# Patient Record
Sex: MALE | Race: WHITE | NOT HISPANIC OR LATINO | Employment: FULL TIME | URBAN - NONMETROPOLITAN AREA
[De-identification: names, ages, dates, MRNs, and addresses within clinical notes are randomized per-mention and may not be internally consistent; named-entity substitution may affect disease eponyms.]

---

## 2023-06-09 ENCOUNTER — APPOINTMENT (OUTPATIENT)
Dept: PRIMARY CARE | Facility: CLINIC | Age: 58
End: 2023-06-09
Payer: COMMERCIAL

## 2023-11-15 ENCOUNTER — HOSPITAL ENCOUNTER (EMERGENCY)
Facility: HOSPITAL | Age: 58
Discharge: HOME | End: 2023-11-15
Attending: EMERGENCY MEDICINE
Payer: COMMERCIAL

## 2023-11-15 ENCOUNTER — APPOINTMENT (OUTPATIENT)
Dept: RADIOLOGY | Facility: HOSPITAL | Age: 58
End: 2023-11-15
Payer: COMMERCIAL

## 2023-11-15 VITALS
HEIGHT: 70 IN | TEMPERATURE: 98.2 F | WEIGHT: 235 LBS | HEART RATE: 99 BPM | RESPIRATION RATE: 20 BRPM | OXYGEN SATURATION: 98 % | BODY MASS INDEX: 33.64 KG/M2 | DIASTOLIC BLOOD PRESSURE: 87 MMHG | SYSTOLIC BLOOD PRESSURE: 141 MMHG

## 2023-11-15 DIAGNOSIS — R07.9 CHEST PAIN, UNSPECIFIED TYPE: Primary | ICD-10-CM

## 2023-11-15 LAB
ALBUMIN SERPL BCP-MCNC: 4.5 G/DL (ref 3.4–5)
ALP SERPL-CCNC: 95 U/L (ref 33–120)
ALT SERPL W P-5'-P-CCNC: 45 U/L (ref 10–52)
ANION GAP SERPL CALC-SCNC: 18 MMOL/L (ref 10–20)
APPEARANCE UR: CLEAR
AST SERPL W P-5'-P-CCNC: 40 U/L (ref 9–39)
BASOPHILS # BLD AUTO: 0.05 X10*3/UL (ref 0–0.1)
BASOPHILS NFR BLD AUTO: 0.5 %
BILIRUB DIRECT SERPL-MCNC: 0.1 MG/DL (ref 0–0.3)
BILIRUB SERPL-MCNC: 0.4 MG/DL (ref 0–1.2)
BILIRUB UR STRIP.AUTO-MCNC: NEGATIVE MG/DL
BUN SERPL-MCNC: 37 MG/DL (ref 6–23)
CALCIUM SERPL-MCNC: 9.5 MG/DL (ref 8.6–10.3)
CARDIAC TROPONIN I PNL SERPL HS: 4 NG/L (ref 0–20)
CARDIAC TROPONIN I PNL SERPL HS: 5 NG/L (ref 0–20)
CHLORIDE SERPL-SCNC: 110 MMOL/L (ref 98–107)
CO2 SERPL-SCNC: 15 MMOL/L (ref 21–32)
COLOR UR: YELLOW
CREAT SERPL-MCNC: 2.7 MG/DL (ref 0.5–1.3)
EOSINOPHIL # BLD AUTO: 0.27 X10*3/UL (ref 0–0.7)
EOSINOPHIL NFR BLD AUTO: 2.5 %
ERYTHROCYTE [DISTWIDTH] IN BLOOD BY AUTOMATED COUNT: 14.5 % (ref 11.5–14.5)
GFR SERPL CREATININE-BSD FRML MDRD: 27 ML/MIN/1.73M*2
GLUCOSE SERPL-MCNC: 173 MG/DL (ref 74–99)
GLUCOSE UR STRIP.AUTO-MCNC: NEGATIVE MG/DL
HCT VFR BLD AUTO: 39.6 % (ref 41–52)
HGB BLD-MCNC: 13.8 G/DL (ref 13.5–17.5)
HYALINE CASTS #/AREA URNS AUTO: ABNORMAL /LPF
IMM GRANULOCYTES # BLD AUTO: 0.03 X10*3/UL (ref 0–0.7)
IMM GRANULOCYTES NFR BLD AUTO: 0.3 % (ref 0–0.9)
INR PPP: 1.1 (ref 0.9–1.1)
KETONES UR STRIP.AUTO-MCNC: NEGATIVE MG/DL
LACTATE SERPL-SCNC: 1.2 MMOL/L (ref 0.4–2)
LEUKOCYTE ESTERASE UR QL STRIP.AUTO: NEGATIVE
LIPASE SERPL-CCNC: 50 U/L (ref 9–82)
LYMPHOCYTES # BLD AUTO: 1.51 X10*3/UL (ref 1.2–4.8)
LYMPHOCYTES NFR BLD AUTO: 14 %
MAGNESIUM SERPL-MCNC: 1.31 MG/DL (ref 1.6–2.4)
MCH RBC QN AUTO: 34 PG (ref 26–34)
MCHC RBC AUTO-ENTMCNC: 34.8 G/DL (ref 32–36)
MCV RBC AUTO: 98 FL (ref 80–100)
MONOCYTES # BLD AUTO: 0.68 X10*3/UL (ref 0.1–1)
MONOCYTES NFR BLD AUTO: 6.3 %
NEUTROPHILS # BLD AUTO: 8.23 X10*3/UL (ref 1.2–7.7)
NEUTROPHILS NFR BLD AUTO: 76.4 %
NITRITE UR QL STRIP.AUTO: NEGATIVE
NRBC BLD-RTO: 0 /100 WBCS (ref 0–0)
PH UR STRIP.AUTO: 5 [PH]
PLATELET # BLD AUTO: 194 X10*3/UL (ref 150–450)
POTASSIUM SERPL-SCNC: 5 MMOL/L (ref 3.5–5.3)
PROT SERPL-MCNC: 7.1 G/DL (ref 6.4–8.2)
PROT UR STRIP.AUTO-MCNC: ABNORMAL MG/DL
PROTHROMBIN TIME: 12.3 SECONDS (ref 9.8–12.8)
RBC # BLD AUTO: 4.06 X10*6/UL (ref 4.5–5.9)
RBC # UR STRIP.AUTO: NEGATIVE /UL
RBC #/AREA URNS AUTO: ABNORMAL /HPF
SODIUM SERPL-SCNC: 138 MMOL/L (ref 136–145)
SP GR UR STRIP.AUTO: 1.01
UROBILINOGEN UR STRIP.AUTO-MCNC: <2 MG/DL
WBC # BLD AUTO: 10.8 X10*3/UL (ref 4.4–11.3)
WBC #/AREA URNS AUTO: ABNORMAL /HPF

## 2023-11-15 PROCEDURE — 81001 URINALYSIS AUTO W/SCOPE: CPT | Performed by: HEALTH CARE PROVIDER

## 2023-11-15 PROCEDURE — 36415 COLL VENOUS BLD VENIPUNCTURE: CPT | Performed by: HEALTH CARE PROVIDER

## 2023-11-15 PROCEDURE — 84484 ASSAY OF TROPONIN QUANT: CPT | Performed by: HEALTH CARE PROVIDER

## 2023-11-15 PROCEDURE — 80053 COMPREHEN METABOLIC PANEL: CPT | Performed by: HEALTH CARE PROVIDER

## 2023-11-15 PROCEDURE — 2500000004 HC RX 250 GENERAL PHARMACY W/ HCPCS (ALT 636 FOR OP/ED): Performed by: HEALTH CARE PROVIDER

## 2023-11-15 PROCEDURE — 83605 ASSAY OF LACTIC ACID: CPT | Performed by: HEALTH CARE PROVIDER

## 2023-11-15 PROCEDURE — 85025 COMPLETE CBC W/AUTO DIFF WBC: CPT | Performed by: HEALTH CARE PROVIDER

## 2023-11-15 PROCEDURE — 85610 PROTHROMBIN TIME: CPT | Performed by: HEALTH CARE PROVIDER

## 2023-11-15 PROCEDURE — 2500000001 HC RX 250 WO HCPCS SELF ADMINISTERED DRUGS (ALT 637 FOR MEDICARE OP): Performed by: HEALTH CARE PROVIDER

## 2023-11-15 PROCEDURE — 96365 THER/PROPH/DIAG IV INF INIT: CPT

## 2023-11-15 PROCEDURE — 76705 ECHO EXAM OF ABDOMEN: CPT

## 2023-11-15 PROCEDURE — 71045 X-RAY EXAM CHEST 1 VIEW: CPT

## 2023-11-15 PROCEDURE — 99285 EMERGENCY DEPT VISIT HI MDM: CPT | Mod: 25 | Performed by: EMERGENCY MEDICINE

## 2023-11-15 PROCEDURE — 76705 ECHO EXAM OF ABDOMEN: CPT | Performed by: RADIOLOGY

## 2023-11-15 PROCEDURE — 83690 ASSAY OF LIPASE: CPT | Performed by: HEALTH CARE PROVIDER

## 2023-11-15 PROCEDURE — 82248 BILIRUBIN DIRECT: CPT | Performed by: HEALTH CARE PROVIDER

## 2023-11-15 PROCEDURE — 83735 ASSAY OF MAGNESIUM: CPT | Performed by: HEALTH CARE PROVIDER

## 2023-11-15 PROCEDURE — 71045 X-RAY EXAM CHEST 1 VIEW: CPT | Performed by: RADIOLOGY

## 2023-11-15 RX ORDER — NAPROXEN SODIUM 220 MG/1
324 TABLET, FILM COATED ORAL ONCE
Status: COMPLETED | OUTPATIENT
Start: 2023-11-15 | End: 2023-11-15

## 2023-11-15 RX ADMIN — MAGNESIUM SULFATE HEPTAHYDRATE 1 G: 500 INJECTION, SOLUTION INTRAMUSCULAR; INTRAVENOUS at 16:20

## 2023-11-15 RX ADMIN — ASPIRIN 81 MG CHEWABLE TABLET 324 MG: 81 TABLET CHEWABLE at 15:54

## 2023-11-15 ASSESSMENT — HEART SCORE
RISK FACTORS: 1-2 RISK FACTORS
HISTORY: SLIGHTLY SUSPICIOUS
HEART SCORE: 2
TROPONIN: LESS THAN OR EQUAL TO NORMAL LIMIT
AGE: 45-64
ECG: NORMAL

## 2023-11-15 ASSESSMENT — PAIN SCALES - GENERAL
PAINLEVEL_OUTOF10: 8
PAINLEVEL_OUTOF10: 8

## 2023-11-15 ASSESSMENT — PAIN DESCRIPTION - LOCATION: LOCATION: CHEST

## 2023-11-15 ASSESSMENT — PAIN - FUNCTIONAL ASSESSMENT
PAIN_FUNCTIONAL_ASSESSMENT: 0-10
PAIN_FUNCTIONAL_ASSESSMENT: 0-10

## 2023-11-15 ASSESSMENT — COLUMBIA-SUICIDE SEVERITY RATING SCALE - C-SSRS
2. HAVE YOU ACTUALLY HAD ANY THOUGHTS OF KILLING YOURSELF?: NO
1. IN THE PAST MONTH, HAVE YOU WISHED YOU WERE DEAD OR WISHED YOU COULD GO TO SLEEP AND NOT WAKE UP?: NO
6. HAVE YOU EVER DONE ANYTHING, STARTED TO DO ANYTHING, OR PREPARED TO DO ANYTHING TO END YOUR LIFE?: NO

## 2023-11-15 ASSESSMENT — PAIN DESCRIPTION - ORIENTATION: ORIENTATION: RIGHT

## 2023-11-15 NOTE — ED PROVIDER NOTES
HPI   Chief Complaint   Patient presents with    Chest Pain     Chest pain on R side started yesterday, along with some SOB. Pt states hx HTN       CC: chest pain  HPI:   58-year-old male presents ED with cute onset of right lower chest wall pain right upper abdominal tenderness worse with eating or deep inspirations patient reports history of solitary kidney congenital, chronic kidney disease, hypertension, hyperlipidemia, denies any associated nausea vomiting, he denies any dizziness or lightheadedness.  Patient denies any abdominal surgeries or history of gallstones.  Patient is seen by cardiologist he reported last month having normal echo and previous normal stress test.    Additional Limitations to History:   External Records Reviewed: I reviewed recent and relevant outside records including   History Obtained From:     Past Medical History: Per HPI  Medications: Reviewed in EMR and with patient  Allergies:  Reviewed in EMR  Past Surgical History:   Social History:     ------------------------------------------------------------------------------------------------------  Physical Exam:  --Vital signs reviewed in nursing triage note, EMR flow sheets, and at patient's bedside  GEN:  A&Ox3, no acute distress, appears comfortable.  Conversational and appropriate.  No confusion or gross mental status changes.  EYES: EOMI, non-injected sclera.  ENT: Moist mucous membranes, no apparent injuries or lesions.   CARDIO: Normal rate and regular rhythm. No murmurs, rubs, or gallops.  2+ equal pulses of the distal extremities.   PULM: Clear to auscultation bilaterally. No rales, rhonchi, or wheezes. Good symmetric chest expansion.  GI: Soft, non-tender, non-distended. No rebound tenderness or guarding.  SKIN: Warm and dry, no rashes or lesions.  MSK: ROM intact the extremities without contractures.   EXT: No peripheral edema, contusions, or wounds.   NEURO: Cranial nerves II-XII grossly intact. Sensation to light touch  intact and equal bilaterally in upper and lower extremities.  Symmetric 5/5 strength in upper and lower extremities.  PSYCH: Appropriate mood and behavior, converses and responds appropriately during exam.  -------------------------------------------------------------------------------------------------------    Medical Decision Making:  Patient seen and evaluated by ED attending. On arrival the patient     Differential Diagnoses Considered:   Chronic Medical Conditions Significantly Affecting Care:   Diagnostic testing considered: [PERC, D-Dimer, PECARN, etc.]    - EKG interpreted by myself sinus tachycardia ventricular rate 107 IA interval 138 normal QRS duration no prolonged QT/QTc no obvious ST elevation/depression or T wave inversion no acute ischemic findings.  - I independently interpreted: [CXR, CT, POCUS, etc. including your interpretation]  - Labs notable for hypomagnesia    Escalation of Care: Appropriate for   Social Determinants of Health Significantly Affecting Care: [Homelessness, lacking transportation, uninsured, unable to afford medications]  Prescription Drug Consideration: [Antibiotics, antivirals, pain medications, etc.]  Discussion of Management with Other Providers:  I discussed the patient/results with: [admitting team, consultant, radiologist, social work, EPAT, case management, PT/OT, RT, PCP, etc.]      Ruslan Zapata PA-C                          No data recorded                Patient History   Past Medical History:   Diagnosis Date    Chronic kidney disease, stage 3 unspecified (CMS/HCC) 08/26/2021    Stage 3 chronic kidney disease, unspecified whether stage 3a or 3b CKD    Preauricular sinus and cyst 07/14/2021    Cyst on ear     Past Surgical History:   Procedure Laterality Date    APPENDECTOMY  03/21/2016    Appendectomy     No family history on file.  Social History     Tobacco Use    Smoking status: Never    Smokeless tobacco: Never   Substance Use Topics    Alcohol use: Yes      Alcohol/week: 2.0 standard drinks of alcohol     Types: 2 Cans of beer per week    Drug use: Not on file       Physical Exam   ED Triage Vitals [11/15/23 1515]   Temp Heart Rate Resp BP   36.8 °C (98.2 °F) (!) 112 18 135/87      SpO2 Temp Source Heart Rate Source Patient Position   95 % Oral -- --      BP Location FiO2 (%)     -- --       Physical Exam    ED Course & MDM   ED Course as of 11/15/23 1829   Wed Nov 15, 2023   1734 Creatinine(!): 2.70 [BK]      ED Course User Index  [BK] Dexter Wellington MD         Diagnoses as of 11/15/23 1829   Chest pain, unspecified type       Medical Decision Making  58-year-old male with acute right anterior lower chest wall pain reproducible, laboratory work-up in the ED indicated hypomagnesia, replaced with 1 g magnesium sulfate given his renal insufficiency, troponins normal, imaging negative for cholecystitis, remaining laboratory work-up appears unremarkable, chronic kidney disease.  Findings were discussed with patient's cardiologist who recommended patient follow-up within 48 hours with cardiologist.  Low suspicion for acute coronary event or acute surgical abdomen no evidence suggesting pulmonary emboli.        Procedure  Procedures     Ruslan Zapata PA-C  11/15/23 0643

## 2023-12-04 ENCOUNTER — HOSPITAL ENCOUNTER (OUTPATIENT)
Dept: CARDIOLOGY | Facility: HOSPITAL | Age: 58
Discharge: HOME | End: 2023-12-04
Payer: COMMERCIAL

## 2023-12-04 PROCEDURE — 93005 ELECTROCARDIOGRAM TRACING: CPT

## 2023-12-05 LAB
ATRIAL RATE: 107 BPM
ATRIAL RATE: 107 BPM
P AXIS: 38 DEGREES
P AXIS: 57 DEGREES
P OFFSET: 207 MS
P OFFSET: 208 MS
P ONSET: 162 MS
P ONSET: 163 MS
PR INTERVAL: 126 MS
PR INTERVAL: 130 MS
Q ONSET: 226 MS
Q ONSET: 227 MS
QRS COUNT: 18 BEATS
QRS COUNT: 18 BEATS
QRS DURATION: 68 MS
QRS DURATION: 72 MS
QT INTERVAL: 320 MS
QT INTERVAL: 324 MS
QTC CALCULATION(BAZETT): 427 MS
QTC CALCULATION(BAZETT): 432 MS
QTC FREDERICIA: 388 MS
QTC FREDERICIA: 393 MS
R AXIS: 25 DEGREES
R AXIS: 42 DEGREES
T AXIS: 38 DEGREES
T AXIS: 55 DEGREES
T OFFSET: 386 MS
T OFFSET: 389 MS
VENTRICULAR RATE: 107 BPM
VENTRICULAR RATE: 107 BPM

## 2023-12-21 ENCOUNTER — OFFICE VISIT (OUTPATIENT)
Dept: ORTHOPEDIC SURGERY | Facility: CLINIC | Age: 58
End: 2023-12-21
Payer: COMMERCIAL

## 2023-12-21 DIAGNOSIS — Z96.641 STATUS POST TOTAL HIP REPLACEMENT, RIGHT: Primary | ICD-10-CM

## 2023-12-21 PROCEDURE — 1036F TOBACCO NON-USER: CPT | Performed by: ORTHOPAEDIC SURGERY

## 2023-12-21 PROCEDURE — 99213 OFFICE O/P EST LOW 20 MIN: CPT | Performed by: ORTHOPAEDIC SURGERY

## 2023-12-21 NOTE — PROGRESS NOTES
Subjective    Patient ID: Luis Felipe Sánchez is a 58 y.o. male.    Chief Complaint: Follow-up of the Right Hip (S/P MANN )     Last Surgery: No surgery found  Last Surgery Date: No surgery found    HPI he is a year out from his right total hip he is doing terrific    Objective   Ortho Exam excellent range of motion no swelling neurovascular intact very slight Trendelenburg but he is able to single-leg stand    Image Results:  ECG 12 lead  Sinus tachycardia  Septal infarct , age undetermined  Abnormal ECG  When compared with ECG of 15-NOV-2023 15:12, (unconfirmed)  Septal infarct is now Present  ECG 12 lead  Sinus tachycardia  Otherwise normal ECG  When compared with ECG of 23-NOV-2017 11:45,  No significant change was found      Assessment/Plan at this point is doing terrific I still continue working on abductor strengthening we will see him as needed  Encounter Diagnoses:  Status post total hip replacement, right    No orders of the defined types were placed in this encounter.    No follow-ups on file.

## 2024-11-01 ENCOUNTER — OFFICE VISIT (OUTPATIENT)
Dept: URGENT CARE | Facility: URGENT CARE | Age: 59
End: 2024-11-01
Payer: COMMERCIAL

## 2024-11-01 VITALS
TEMPERATURE: 98.5 F | SYSTOLIC BLOOD PRESSURE: 155 MMHG | BODY MASS INDEX: 31.41 KG/M2 | WEIGHT: 218.92 LBS | OXYGEN SATURATION: 95 % | HEART RATE: 107 BPM | DIASTOLIC BLOOD PRESSURE: 93 MMHG | RESPIRATION RATE: 20 BRPM

## 2024-11-01 DIAGNOSIS — S76.012A STRAIN OF LEFT PSOAS MUSCLE, INITIAL ENCOUNTER: ICD-10-CM

## 2024-11-01 DIAGNOSIS — M62.830 BACK MUSCLE SPASM: ICD-10-CM

## 2024-11-01 DIAGNOSIS — S39.012A ACUTE MYOFASCIAL STRAIN OF LUMBAR REGION, INITIAL ENCOUNTER: ICD-10-CM

## 2024-11-01 DIAGNOSIS — M53.3 SACROILIAC JOINT DYSFUNCTION OF LEFT SIDE: Primary | ICD-10-CM

## 2024-11-01 PROCEDURE — 4010F ACE/ARB THERAPY RXD/TAKEN: CPT | Performed by: FAMILY MEDICINE

## 2024-11-01 PROCEDURE — 3077F SYST BP >= 140 MM HG: CPT | Performed by: FAMILY MEDICINE

## 2024-11-01 PROCEDURE — 99203 OFFICE O/P NEW LOW 30 MIN: CPT | Performed by: FAMILY MEDICINE

## 2024-11-01 PROCEDURE — 3080F DIAST BP >= 90 MM HG: CPT | Performed by: FAMILY MEDICINE

## 2024-11-01 RX ORDER — FOSINOPRIL SODIUM 20 MG/1
20 TABLET ORAL
COMMUNITY
Start: 2023-09-21

## 2024-11-01 RX ORDER — TAZAROTENE 1 MG/G
CREAM TOPICAL
COMMUNITY
Start: 2024-09-30

## 2024-11-01 RX ORDER — EZETIMIBE 10 MG/1
10 TABLET ORAL EVERY 24 HOURS
COMMUNITY
Start: 2023-12-06 | End: 2025-06-05

## 2024-11-01 RX ORDER — CYCLOBENZAPRINE HCL 10 MG
10 TABLET ORAL 2 TIMES DAILY PRN
Qty: 15 TABLET | Refills: 0 | Status: SHIPPED | OUTPATIENT
Start: 2024-11-01 | End: 2024-11-16

## 2024-11-01 RX ORDER — OMEPRAZOLE 20 MG/1
CAPSULE, DELAYED RELEASE ORAL
COMMUNITY
Start: 2023-01-05

## 2024-11-01 RX ORDER — AMLODIPINE BESYLATE 2.5 MG/1
2.5 TABLET ORAL
COMMUNITY
Start: 2023-09-20 | End: 2025-06-05

## 2024-11-01 RX ORDER — ATORVASTATIN CALCIUM 40 MG/1
TABLET, FILM COATED ORAL EVERY 24 HOURS
COMMUNITY
End: 2024-11-12

## 2024-11-01 RX ORDER — TIRZEPATIDE 2.5 MG/.5ML
INJECTION, SOLUTION SUBCUTANEOUS
COMMUNITY
Start: 2024-05-30

## 2024-11-01 RX ORDER — METOPROLOL SUCCINATE 25 MG/1
25 TABLET, EXTENDED RELEASE ORAL
COMMUNITY
Start: 2022-12-10 | End: 2025-06-05

## 2024-11-01 RX ORDER — PREDNISONE 20 MG/1
TABLET ORAL
Qty: 20 TABLET | Refills: 0 | Status: SHIPPED | OUTPATIENT
Start: 2024-11-01

## 2024-11-01 RX ORDER — METFORMIN HYDROCHLORIDE 500 MG/1
500 TABLET, EXTENDED RELEASE ORAL
COMMUNITY
Start: 2024-08-23

## 2024-11-01 RX ORDER — POTASSIUM CHLORIDE 1500 MG/1
TABLET, EXTENDED RELEASE ORAL
COMMUNITY
Start: 2024-03-05

## 2024-11-01 NOTE — LETTER
November 13, 2024     Patient: Luis Felipe Sánchez   YOB: 1965   Date of Visit: 11/1/2024       To Whom It May Concern:    Luis Felipe Sánchez was seen in my clinic on 11/1/2024 at 12:10 pm. Please excuse Luis Felipe for his absence from work on this day to make the appointment.    If you have any questions or concerns, please don't hesitate to call.         Sincerely,         Hal Foster,         CC: No Recipients

## 2024-11-01 NOTE — PROGRESS NOTES
Subjective   Patient ID: Luis Felipe Sánchez is a 59 y.o. male.    HPI: 59-year-old male presents with complaint of left-sided low back pain for the past 4 days that he reports intermittently radiates to his groin.  He reports that he slipped on Sunday but he did not notice the pain till few days later.  He is concerned because he has bilateral hip replacement no more than 2 years ago following avascular necrosis of the femoral head.  Patient indicates that despite lengthy medication list he is only currently taking metformin, Lipitor, omeprazole, and Monopril.      History provided by:  Patient   used: No        The following portions of the chart were reviewed this encounter and updated as appropriate:  Tobacco  Allergies  Meds  Problems  Med Hx  Surg Hx  Fam Hx         Review of Systems   Constitutional:  Negative for chills and fever.   HENT:  Negative for congestion, ear pain, rhinorrhea and sore throat.    Respiratory:  Negative for chest tightness, shortness of breath and wheezing.    Gastrointestinal:  Negative for constipation, diarrhea, nausea and vomiting.   Genitourinary:  Negative for dysuria and frequency.   Musculoskeletal:  Positive for back pain and myalgias. Negative for joint swelling and neck stiffness.   Skin:  Negative for wound.   Neurological:  Negative for headaches.     Objective   Physical Exam  Vital signs are reviewed.  Blood pressure elevated at 155/93.  Pulse oximetry 95% on room air.  Heart rate tachycardic at 107 bpm.    Alert and oriented x3 with normal mood and affect  Patient is well nourished, well-developed, alert and in no acute distress  Denies pain to palpation over frontal, ethmoid or maxillary sinus areas    External eyes, orbits, conjunctiva and eyelids are normal in appearance  Pupils are equal, round, reactive to light and accommodation, extraocular movements intact    External ears appear normal  External canals are normal in appearance  Right  tympanic membrane is intact and pale gray in appearance  Left tympanic membrane is intact and pale gray in appearance  There is no middle ear effusion noted on the right  There is no middle ear effusion noted on the left  External appearance of the nose is normal  Nasal mucosa, septum, turbinates are mildly reddened and swollen in appearance  There is no nasal discharge in both nares    Oral mucosa is uniformly pink and moist  Palate is pink, symmetric and intact  Tongue is moist, mobile and midline  Posterior pharynx not erythematous with no concretions or exudates present  No cervical lymphadenopathy palpated    Heart has regular rate and rhythm. No murmurs, rubs or gallops are auscultated at this exam.    Respiratory rate rhythm and effort are normal. Breath sounds bilaterally are clear on auscultation without crackles, rhonchi, wheezes or friction rub.    Abdomen: Normal bowel sounds on auscultation. Soft, nontender without rebound or rigidity on palpation    Musculoskeletal: Patient has pain to palpation over the posterior superior iliac spine and along the sacroiliac joint on the left side.  In addition he has tenderness over the area of the left psoas major extending to the groin.  There are tissue texture changes and muscle spasms palpable in the quadratus lumborum and lumbar paraspinal muscles on the left side.  Procedures    Assessment/Plan   Diagnoses and all orders for this visit:  Sacroiliac joint dysfunction of left side  -     predniSONE (Deltasone) 20 mg tablet; 3 tabs p.o. daily x3, 2 tabs  p.o. daily x3, 1 tab p.o. daily x3, one half tab p.o. daily x4  -     cyclobenzaprine (Flexeril) 10 mg tablet; Take 1 tablet (10 mg) by mouth 2 times a day as needed for muscle spasms (use at bedtime/after activities of day are completed) for up to 15 days.  Strain of left psoas muscle, initial encounter  -     predniSONE (Deltasone) 20 mg tablet; 3 tabs p.o. daily x3, 2 tabs  p.o. daily x3, 1 tab p.o. daily x3,  one half tab p.o. daily x4  -     cyclobenzaprine (Flexeril) 10 mg tablet; Take 1 tablet (10 mg) by mouth 2 times a day as needed for muscle spasms (use at bedtime/after activities of day are completed) for up to 15 days.  Back muscle spasm  -     predniSONE (Deltasone) 20 mg tablet; 3 tabs p.o. daily x3, 2 tabs  p.o. daily x3, 1 tab p.o. daily x3, one half tab p.o. daily x4  -     cyclobenzaprine (Flexeril) 10 mg tablet; Take 1 tablet (10 mg) by mouth 2 times a day as needed for muscle spasms (use at bedtime/after activities of day are completed) for up to 15 days.  Acute myofascial strain of lumbar region, initial encounter  -     predniSONE (Deltasone) 20 mg tablet; 3 tabs p.o. daily x3, 2 tabs  p.o. daily x3, 1 tab p.o. daily x3, one half tab p.o. daily x4  -     cyclobenzaprine (Flexeril) 10 mg tablet; Take 1 tablet (10 mg) by mouth 2 times a day as needed for muscle spasms (use at bedtime/after activities of day are completed) for up to 15 days.    Patient disposition: Home

## 2024-11-01 NOTE — PATIENT INSTRUCTIONS
You have a lumbar sprain and low back pain.  You have a psoas strain and a sacroiliac sprain please take  Please take prednisone as a single dose early in the day with food  May use Flexeril for muscle spasm. This medication may cause drowsiness. I recommend that you try Flexeril at the time when you are not doing tasks requiring alertness such as driving or operating machinery to see how it effects your alertness. Based on how drowsy it makes you, may have to limit its use to evening and bedtime hours only.  May use acetaminophen 325 mg, 2 tablets by mouth every 4-6 hours as needed for discomfort  May apply cold compresses to affected area 15-20 minutes times daily to reduce inflammation   If no better in 5-7 days please follow-up with primary care provider   If you have numbness, weakness, tingling in lower extremities or numbness or tingling in pelvic or rectal area or loss of bladder or bowel control please go to immediately to emergency department for further evaluation of this problem  This note was generated by voice recognition software. Minor transcription/grammatical errors may be present. Please call for clarification.

## 2024-11-10 PROBLEM — K58.0 IRRITABLE BOWEL SYNDROME WITH DIARRHEA: Status: ACTIVE | Noted: 2024-11-10

## 2024-11-10 PROBLEM — D22.60 MELANOCYTIC NEVI OF UNSPECIFIED UPPER LIMB, INCLUDING SHOULDER: Status: ACTIVE | Noted: 2020-09-16

## 2024-11-10 PROBLEM — I25.10 CORONARY ARTERY DISEASE INVOLVING NATIVE CORONARY ARTERY OF NATIVE HEART WITHOUT ANGINA PECTORIS: Status: ACTIVE | Noted: 2024-11-10

## 2024-11-10 PROBLEM — D22.5 MELANOCYTIC NEVI OF TRUNK: Status: ACTIVE | Noted: 2020-09-16

## 2024-11-10 PROBLEM — M19.011 PRIMARY OSTEOARTHRITIS, RIGHT SHOULDER: Status: ACTIVE | Noted: 2024-11-10

## 2024-11-10 PROBLEM — M87.059 AVASCULAR NECROSIS OF FEMORAL HEAD (MULTI): Status: ACTIVE | Noted: 2024-11-10

## 2024-11-10 PROBLEM — J30.9 ALLERGIC RHINITIS DUE TO ALLERGEN: Status: ACTIVE | Noted: 2024-11-10

## 2024-11-10 PROBLEM — E66.9 OBESITY: Status: ACTIVE | Noted: 2022-09-26

## 2024-11-10 PROBLEM — L81.4 OTHER MELANIN HYPERPIGMENTATION: Status: ACTIVE | Noted: 2020-09-16

## 2024-11-10 PROBLEM — I25.10 CORONARY ARTERIOSCLEROSIS: Status: ACTIVE | Noted: 2024-11-10

## 2024-11-10 PROBLEM — I10 ESSENTIAL HYPERTENSION: Status: ACTIVE | Noted: 2024-11-10

## 2024-11-10 PROBLEM — M75.02 ADHESIVE CAPSULITIS OF LEFT SHOULDER: Status: ACTIVE | Noted: 2024-11-10

## 2024-11-10 PROBLEM — Z98.890 HISTORY OF REPAIR OF HIP JOINT: Status: ACTIVE | Noted: 2024-11-10

## 2024-11-10 PROBLEM — M51.26 LUMBAR HERNIATED DISC: Status: ACTIVE | Noted: 2024-11-10

## 2024-11-10 PROBLEM — N18.32 STAGE 3B CHRONIC KIDNEY DISEASE (MULTI): Status: ACTIVE | Noted: 2024-11-10

## 2024-11-10 PROBLEM — L91.8 OTHER HYPERTROPHIC DISORDERS OF THE SKIN: Status: ACTIVE | Noted: 2020-09-16

## 2024-11-10 PROBLEM — J34.2 DEVIATED NASAL SEPTUM: Status: ACTIVE | Noted: 2024-11-10

## 2024-11-10 PROBLEM — D18.01 HEMANGIOMA OF SKIN AND SUBCUTANEOUS TISSUE: Status: ACTIVE | Noted: 2020-09-16

## 2024-11-10 PROBLEM — Z96.641 STATUS POST RIGHT HIP REPLACEMENT: Status: ACTIVE | Noted: 2024-11-10

## 2024-11-10 PROBLEM — I89.9 LYMPH LEAK: Status: ACTIVE | Noted: 2024-11-10

## 2024-11-10 PROBLEM — M87.051 AVASCULAR NECROSIS OF BONE OF RIGHT HIP (MULTI): Status: ACTIVE | Noted: 2024-11-10

## 2024-11-10 PROBLEM — L82.1 OTHER SEBORRHEIC KERATOSIS: Status: ACTIVE | Noted: 2020-09-16

## 2024-11-10 PROBLEM — Z90.5 SINGLE KIDNEY: Status: ACTIVE | Noted: 2024-11-10

## 2024-11-10 PROBLEM — M47.22 CERVICAL RADICULOPATHY DUE TO DEGENERATIVE JOINT DISEASE OF SPINE: Status: ACTIVE | Noted: 2017-12-01

## 2024-11-10 PROBLEM — M79.89 SWELLING OF LOWER EXTREMITY: Status: ACTIVE | Noted: 2024-11-10

## 2024-11-10 PROBLEM — M50.30 DDD (DEGENERATIVE DISC DISEASE), CERVICAL: Status: ACTIVE | Noted: 2017-12-01

## 2024-11-10 PROBLEM — H90.3 ASYMMETRIC SNHL (SENSORINEURAL HEARING LOSS): Status: ACTIVE | Noted: 2024-11-10

## 2024-11-10 PROBLEM — R80.9 MICROALBUMINURIA: Status: ACTIVE | Noted: 2018-01-17

## 2024-11-10 PROBLEM — E11.9 DIABETES MELLITUS (MULTI): Status: ACTIVE | Noted: 2024-11-10

## 2024-11-10 PROBLEM — M19.90 ARTHRITIS: Status: ACTIVE | Noted: 2024-11-10

## 2024-11-10 PROBLEM — M47.22 CERVICAL SPONDYLOSIS WITH RADICULOPATHY: Status: ACTIVE | Noted: 2017-12-11

## 2024-11-10 PROBLEM — M53.3 SACROILIAC JOINT DYSFUNCTION OF RIGHT SIDE: Status: ACTIVE | Noted: 2024-11-10

## 2024-11-10 PROBLEM — M48.02 CERVICAL SPINAL STENOSIS: Status: ACTIVE | Noted: 2024-11-10

## 2024-11-10 PROBLEM — M53.3 DISORDER OF SACROILIAC JOINT: Status: ACTIVE | Noted: 2024-11-10

## 2024-11-10 PROBLEM — K63.5 HYPERPLASTIC COLON POLYP: Status: ACTIVE | Noted: 2024-11-10

## 2024-11-10 PROBLEM — L73.8 OTHER SPECIFIED FOLLICULAR DISORDERS: Status: ACTIVE | Noted: 2020-09-16

## 2024-11-10 PROBLEM — R06.83 SNORING: Status: ACTIVE | Noted: 2024-11-10

## 2024-11-10 PROBLEM — M70.60 GREATER TROCHANTERIC BURSITIS: Status: ACTIVE | Noted: 2024-11-10

## 2024-11-10 PROBLEM — E78.2 MIXED HYPERLIPIDEMIA: Status: ACTIVE | Noted: 2024-11-10

## 2024-11-10 PROBLEM — M25.551 RIGHT HIP PAIN: Status: ACTIVE | Noted: 2018-08-02

## 2024-11-10 PROBLEM — R79.82 ELEVATED C-REACTIVE PROTEIN (CRP): Status: ACTIVE | Noted: 2018-01-17

## 2024-11-10 PROBLEM — H91.93 DECREASED HEARING OF BOTH EARS: Status: ACTIVE | Noted: 2024-11-10

## 2024-11-10 PROBLEM — M25.50 ARTHRALGIA OF MULTIPLE JOINTS: Status: ACTIVE | Noted: 2024-11-10

## 2024-11-10 ASSESSMENT — ENCOUNTER SYMPTOMS
VOMITING: 0
CHEST TIGHTNESS: 0
WHEEZING: 0
CHILLS: 0
FREQUENCY: 0
WOUND: 0
HEADACHES: 0
JOINT SWELLING: 0
NAUSEA: 0
RHINORRHEA: 0
CONSTIPATION: 0
SORE THROAT: 0
DIARRHEA: 0
BACK PAIN: 1
DYSURIA: 0
NECK STIFFNESS: 0
FEVER: 0
MYALGIAS: 1
SHORTNESS OF BREATH: 0

## 2024-11-12 DIAGNOSIS — E78.5 HYPERLIPIDEMIA: Primary | ICD-10-CM

## 2024-11-12 RX ORDER — ATORVASTATIN CALCIUM 40 MG/1
40 TABLET, FILM COATED ORAL DAILY
Qty: 90 TABLET | Refills: 3 | Status: SHIPPED | OUTPATIENT
Start: 2024-11-12

## 2025-01-19 ENCOUNTER — CLINICAL SUPPORT (OUTPATIENT)
Dept: URGENT CARE | Facility: URGENT CARE | Age: 60
End: 2025-01-19
Payer: COMMERCIAL

## 2025-01-19 VITALS
SYSTOLIC BLOOD PRESSURE: 158 MMHG | OXYGEN SATURATION: 98 % | HEART RATE: 107 BPM | RESPIRATION RATE: 18 BRPM | DIASTOLIC BLOOD PRESSURE: 74 MMHG | WEIGHT: 217 LBS | BODY MASS INDEX: 31.14 KG/M2 | TEMPERATURE: 98.2 F

## 2025-01-19 DIAGNOSIS — R25.1 SHAKING: Primary | ICD-10-CM
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
## 2025-01-19 DIAGNOSIS — R05.1 ACUTE COUGH: ICD-10-CM

## 2025-01-19 DIAGNOSIS — R51.9 NONINTRACTABLE HEADACHE, UNSPECIFIED CHRONICITY PATTERN, UNSPECIFIED HEADACHE TYPE: ICD-10-CM

## 2025-01-19 DIAGNOSIS — R53.1 WEAKNESS: ICD-10-CM

## 2025-01-19 DIAGNOSIS — R63.0 NO APPETITE: ICD-10-CM

## 2025-01-19 PROBLEM — M75.01 ADHESIVE CAPSULITIS OF RIGHT SHOULDER: Status: ACTIVE | Noted: 2025-01-19

## 2025-01-19 LAB
POC BINAX EXPIRATION: NEGATIVE
POC BINAX NOW COVID SERIAL NUMBER: NEGATIVE
POC FINGERSTICK BLOOD GLUCOSE: 145 MG/DL (ref 70–100)
POC RAPID INFLUENZA A: NEGATIVE
POC RAPID INFLUENZA B: NEGATIVE
POC SARS-COV-2 AG BINAX: NORMAL

## 2025-01-19 ASSESSMENT — PAIN SCALES - GENERAL: PAINLEVEL_OUTOF10: 6

## 2025-01-29 ASSESSMENT — ENCOUNTER SYMPTOMS
NAUSEA: 0
RHINORRHEA: 0
PALPITATIONS: 0
CHEST TIGHTNESS: 0
SHORTNESS OF BREATH: 0
SINUS PRESSURE: 0
FEVER: 1
APPETITE CHANGE: 1
FREQUENCY: 0
DIARRHEA: 0
COUGH: 1
VOMITING: 0
CONSTIPATION: 0
CHILLS: 1
SORE THROAT: 0
HEADACHES: 1
WHEEZING: 0
DYSURIA: 0
ABDOMINAL PAIN: 0

## 2025-01-30 NOTE — PROGRESS NOTES
Subjective   Patient ID: Luis Felipe Sánchez is a 59 y.o. male.    HPI: 59-year-old male presents with wife with complaint of headache and not wanting to eat.  States that he began feeling shaky this morning.  He reports that he has had a cough nonproductive cough.  He reports feeling alternately hot and cold.  He states that his color is pale.      History provided by:  Patient   used: No        The following portions of the chart were reviewed this encounter and updated as appropriate:  Tobacco  Allergies  Meds  Problems  Med Hx  Surg Hx  Fam Hx         Review of Systems   Constitutional:  Positive for appetite change, chills and fever.   HENT:  Negative for congestion, ear pain, rhinorrhea, sinus pressure and sore throat.    Respiratory:  Positive for cough (Nonproductive). Negative for chest tightness, shortness of breath and wheezing.    Cardiovascular:  Negative for palpitations.   Gastrointestinal:  Negative for abdominal pain, constipation, diarrhea, nausea and vomiting.   Genitourinary:  Negative for dysuria and frequency.   Neurological:  Positive for headaches.     Objective   Physical Exam  Vital signs are reviewed.  Blood pressure elevated at 158/74.  Tachycardic at 107 bpm.   Alert and oriented x3 with normal mood and affect  Patient is well nourished, well-developed, alert and in no acute distress    External eyes, orbits, conjunctiva and eyelids are normal in appearance  Pupils are equal, round, reactive to light and accommodation, extraocular movements intact    External ears appear normal  External canals are normal in appearance  Right tympanic membrane is intact and pale gray in appearance  Left tympanic membrane is intact and pale gray in appearance  There is no middle ear effusion noted on the right  There is no middle ear effusion noted on the left  External appearance of the nose is normal  Nasal mucosa, septum, turbinates are mildly swollen and mildly reddened in  appearance  There is thin white nasal discharge in both nares    Oral mucosa is uniformly pink and moist  Palate is pink, symmetric and intact  Tongue is moist, mobile and midline  Posterior pharynx not erythematous with no concretions or exudates present  No cervical lymphadenopathy palpated    Heart has regular rate and rhythm.  Extremely tachycardic.  No murmurs, rubs or gallops are auscultated at this exam.    Respiratory rate rhythm and effort are normal. Breath sounds bilaterally are clear on auscultation without crackles, rhonchi, wheezes or friction rub.    Abdomen: Normal bowel sounds on auscultation. Soft, nontender without rebound or rigidity on palpation    EKG demonstrates atrial fibrillation with rapid ventricular response at a rate of 154 bpm.  There are PVCs.  There is an abnormality of repolarization in the inferior aspect worrisome for possible ischemia.  Procedures    Assessment/Plan     MDM: Discussed with patient and partner that a rapid ventricular rate with new onset atrial fibrillation could be responsible for many of his symptoms including weakness and feeling shaky.  Explained that he needs emergency department care to address his atrial fibrillation.  Patient stated he wanted to go to nearest hospital which is [facility].  Indicates wife will transport him.  Other than heart rate other vitals are stable and will allow transport by personal vehicle with the understanding that he needs to go directly to the emergency department.  Patient and spouse indicate there understanding and agreement.  Diagnoses and all orders for this visit:  Shaking  -     ECG 12 Lead  -     POCT fingerstick glucose manually resulted  -     POCT Covid-19 Rapid Antigen  -     POCT Influenza A/B manually resulted  Acute cough  -     ECG 12 Lead  -     POCT fingerstick glucose manually resulted  -     POCT Covid-19 Rapid Antigen  -     POCT Influenza A/B manually resulted  Nonintractable headache, unspecified chronicity  pattern, unspecified headache type  -     ECG 12 Lead  -     POCT fingerstick glucose manually resulted  -     POCT Covid-19 Rapid Antigen  -     POCT Influenza A/B manually resulted  Weakness  -     ECG 12 Lead  -     POCT fingerstick glucose manually resulted  -     POCT Covid-19 Rapid Antigen  -     POCT Influenza A/B manually resulted  No appetite  -     ECG 12 Lead  -     POCT fingerstick glucose manually resulted  -     POCT Covid-19 Rapid Antigen  -     POCT Influenza A/B manually resulted  Inappropriate (too hot or too cold) temperature in local application and packing  -     ECG 12 Lead  -     POCT fingerstick glucose manually resulted  -     POCT Covid-19 Rapid Antigen  -     POCT Influenza A/B manually resulted    Patient disposition: ED

## 2025-08-14 ENCOUNTER — TELEPHONE (OUTPATIENT)
Dept: GASTROENTEROLOGY | Facility: CLINIC | Age: 60
End: 2025-08-14
Payer: COMMERCIAL